# Patient Record
Sex: FEMALE | Race: OTHER | NOT HISPANIC OR LATINO | Employment: FULL TIME | URBAN - METROPOLITAN AREA
[De-identification: names, ages, dates, MRNs, and addresses within clinical notes are randomized per-mention and may not be internally consistent; named-entity substitution may affect disease eponyms.]

---

## 2022-09-02 DIAGNOSIS — G62.9 NEUROPATHY: Primary | ICD-10-CM

## 2022-09-02 DIAGNOSIS — M62.81 MUSCLE WEAKNESS: ICD-10-CM

## 2022-09-28 ENCOUNTER — OFFICE VISIT (OUTPATIENT)
Dept: NEUROLOGY | Facility: CLINIC | Age: 42
End: 2022-09-28
Payer: COMMERCIAL

## 2022-09-28 VITALS
HEART RATE: 84 BPM | DIASTOLIC BLOOD PRESSURE: 86 MMHG | WEIGHT: 167 LBS | SYSTOLIC BLOOD PRESSURE: 110 MMHG | HEIGHT: 61 IN | BODY MASS INDEX: 31.53 KG/M2

## 2022-09-28 DIAGNOSIS — G62.9 NEUROPATHY: ICD-10-CM

## 2022-09-28 DIAGNOSIS — M62.81 MUSCLE WEAKNESS: ICD-10-CM

## 2022-09-28 PROCEDURE — 3008F BODY MASS INDEX DOCD: CPT | Mod: CPTII,S$GLB,, | Performed by: PSYCHIATRY & NEUROLOGY

## 2022-09-28 PROCEDURE — 99204 OFFICE O/P NEW MOD 45 MIN: CPT | Mod: S$GLB,,, | Performed by: PSYCHIATRY & NEUROLOGY

## 2022-09-28 PROCEDURE — 99204 PR OFFICE/OUTPT VISIT, NEW, LEVL IV, 45-59 MIN: ICD-10-PCS | Mod: S$GLB,,, | Performed by: PSYCHIATRY & NEUROLOGY

## 2022-09-28 PROCEDURE — 3079F DIAST BP 80-89 MM HG: CPT | Mod: CPTII,S$GLB,, | Performed by: PSYCHIATRY & NEUROLOGY

## 2022-09-28 PROCEDURE — 99999 PR PBB SHADOW E&M-EST. PATIENT-LVL IV: ICD-10-PCS | Mod: PBBFAC,,, | Performed by: PSYCHIATRY & NEUROLOGY

## 2022-09-28 PROCEDURE — 1160F RVW MEDS BY RX/DR IN RCRD: CPT | Mod: CPTII,S$GLB,, | Performed by: PSYCHIATRY & NEUROLOGY

## 2022-09-28 PROCEDURE — 1160F PR REVIEW ALL MEDS BY PRESCRIBER/CLIN PHARMACIST DOCUMENTED: ICD-10-PCS | Mod: CPTII,S$GLB,, | Performed by: PSYCHIATRY & NEUROLOGY

## 2022-09-28 PROCEDURE — 3074F SYST BP LT 130 MM HG: CPT | Mod: CPTII,S$GLB,, | Performed by: PSYCHIATRY & NEUROLOGY

## 2022-09-28 PROCEDURE — 99999 PR PBB SHADOW E&M-EST. PATIENT-LVL IV: CPT | Mod: PBBFAC,,, | Performed by: PSYCHIATRY & NEUROLOGY

## 2022-09-28 PROCEDURE — 1159F MED LIST DOCD IN RCRD: CPT | Mod: CPTII,S$GLB,, | Performed by: PSYCHIATRY & NEUROLOGY

## 2022-09-28 PROCEDURE — 3008F PR BODY MASS INDEX (BMI) DOCUMENTED: ICD-10-PCS | Mod: CPTII,S$GLB,, | Performed by: PSYCHIATRY & NEUROLOGY

## 2022-09-28 PROCEDURE — 1159F PR MEDICATION LIST DOCUMENTED IN MEDICAL RECORD: ICD-10-PCS | Mod: CPTII,S$GLB,, | Performed by: PSYCHIATRY & NEUROLOGY

## 2022-09-28 PROCEDURE — 3079F PR MOST RECENT DIASTOLIC BLOOD PRESSURE 80-89 MM HG: ICD-10-PCS | Mod: CPTII,S$GLB,, | Performed by: PSYCHIATRY & NEUROLOGY

## 2022-09-28 PROCEDURE — 3074F PR MOST RECENT SYSTOLIC BLOOD PRESSURE < 130 MM HG: ICD-10-PCS | Mod: CPTII,S$GLB,, | Performed by: PSYCHIATRY & NEUROLOGY

## 2022-09-28 RX ORDER — PNV NO.95/FERROUS FUM/FOLIC AC 28MG-0.8MG
5000 TABLET ORAL DAILY
COMMUNITY

## 2022-09-28 RX ORDER — CHOLECALCIFEROL (VITAMIN D3) 125 MCG
5000 CAPSULE ORAL DAILY
COMMUNITY

## 2022-09-28 RX ORDER — GABAPENTIN 300 MG/1
300 CAPSULE ORAL DAILY
Qty: 30 CAPSULE | Refills: 11 | Status: SHIPPED | OUTPATIENT
Start: 2022-09-28 | End: 2023-03-15

## 2022-09-28 RX ORDER — GABAPENTIN 100 MG/1
2 CAPSULE ORAL DAILY
COMMUNITY
Start: 2022-09-01 | End: 2022-09-28 | Stop reason: SDUPTHER

## 2022-09-28 RX ORDER — FLUCONAZOLE 150 MG/1
300 TABLET ORAL
COMMUNITY
Start: 2022-09-20 | End: 2023-03-15

## 2022-09-28 RX ORDER — LIOTHYRONINE SODIUM 5 UG/1
5 TABLET ORAL 2 TIMES DAILY
COMMUNITY
Start: 2022-07-25

## 2022-09-28 RX ORDER — HYDROCHLOROTHIAZIDE 25 MG/1
25 TABLET ORAL DAILY
COMMUNITY
Start: 2022-08-15

## 2022-09-28 RX ORDER — ESTRADIOL 2 MG/1
2 TABLET ORAL DAILY
COMMUNITY
Start: 2022-08-15

## 2022-09-28 RX ORDER — LEVOTHYROXINE SODIUM 50 UG/1
50 TABLET ORAL EVERY MORNING
COMMUNITY
Start: 2022-07-25

## 2022-09-28 RX ORDER — OLOPATADINE HYDROCHLORIDE 1 MG/ML
1 SOLUTION/ DROPS OPHTHALMIC DAILY
COMMUNITY

## 2022-09-28 RX ORDER — CYCLOSPORINE 0.5 MG/ML
1 EMULSION OPHTHALMIC DAILY
COMMUNITY

## 2022-09-28 NOTE — PROGRESS NOTES
Chief Complaint   Patient presents with    Muscle weakness, Neuropathy     NP: Referred by Dolores Andrew NP for neuro consult to evaluate for Muscle weakness and Neuropathy: Is having muscle weakness to hands and feet for past 2 years. Has burning pain to feel. Feet feels cold and tingles. Hands are weak and tingles, falls asleep at night.        This is a 41 y.o. female  here for  neuro consult to evaluate for Muscle weakness and Neuropathy: Is having muscle weakness to hands and feet for past 2 years. Has burning pain to feet. Feet feels cold and tingle. Hands are weak and tingle especially while falling asleep at night.  Symptoms actually started with toenails becoming very thick and pain in her heels.  Around that same time she had dryness of her eyes.  She was diagnosed with thyroid disorder and started on medicine.  She actually saw a rheumatologist and was sent there by her optometrist.  From her report workup with rheumatologist was inconclusive.  Her father had neuropathy and was diabetic.  Patient herself is not diabetic.      MRI brain and C-spine-normal  Vitamin-D 67 folate 23   Sed rate 20   CRP to  NEISHA negative  B12 2308  Creatinine 0.62  Medication List with Changes/Refills   Current Medications    CHOLECALCIFEROL, VITAMIN D3, 125 MCG (5,000 UNIT) CAPSULE    Take 5,000 Units by mouth once daily.    CYANOCOBALAMIN (VITAMIN B-12) 100 MCG TABLET    Take 5,000 mcg by mouth once daily.    CYCLOSPORINE (RESTASIS) 0.05 % OPHTHALMIC EMULSION    Place 1 drop into both eyes Daily.    ESTRADIOL (ESTRACE) 2 MG TABLET    Take 2 mg by mouth once daily.    FLUCONAZOLE (DIFLUCAN) 150 MG TAB    Take 300 mg by mouth every 7 days.    HYDROCHLOROTHIAZIDE (HYDRODIURIL) 25 MG TABLET    Take 25 mg by mouth once daily.    LEVOTHYROXINE (SYNTHROID) 50 MCG TABLET    Take 50 mcg by mouth every morning.    LIOTHYRONINE (CYTOMEL) 5 MCG TAB    Take 5 mcg by mouth 2 (two) times daily.    MV-MN/FOLIC/LUTEIN/HERBAL 293 (ALIVE MEN'S 50  PLUS MULTIVIT ORAL)    Take 1 tablet by mouth Daily.    OLOPATADINE (PATANOL) 0.1 % OPHTHALMIC SOLUTION    Place 1 drop into both eyes Daily.   Changed and/or Refilled Medications    Modified Medication Previous Medication    GABAPENTIN (NEURONTIN) 300 MG CAPSULE gabapentin (NEURONTIN) 100 MG capsule       Take 1 capsule (300 mg total) by mouth Daily.    Take 2 capsules by mouth Daily.        Past Surgical History:   Procedure Laterality Date    HYSTERECTOMY      Laparoscopy & tubal  03/2014    LITHOTRIPSY      Right knee surgery Right 1991    WISDOM TOOTH EXTRACTION          Past Medical History:   Diagnosis Date    Dry eyes     Essential (primary) hypertension     Hypothyroidism, unspecified     Neuropathy         Family History   Problem Relation Age of Onset    Hypertension Mother     Hypothyroidism Mother     Diabetes Father     Kidney disease Sister     Heart disease Brother         Social History     Socioeconomic History    Marital status: Unknown   Tobacco Use    Smoking status: Never    Smokeless tobacco: Never   Substance and Sexual Activity    Alcohol use: Never    Drug use: Never          Review of Systems  Review of Systems   Constitutional: Negative for appetite change.   HENT: Negative for sinus pressure and sore throat.    Eyes: Negative for visual disturbance.   Respiratory: Negative for cough and shortness of breath.    Cardiovascular: Negative for chest pain.   Gastrointestinal: Negative for diarrhea and nausea.   Endocrine: Negative for cold intolerance and heat intolerance.   Genitourinary: Negative for dysuria.   Musculoskeletal: Negative for arthralgias and myalgias.   Skin: Negative for rash.   Allergic/Immunologic: Negative for immunocompromised state.   Neurological:        See HPI   Hematological: Does not bruise/bleed easily.   Psychiatric/Behavioral: Negative for hallucinations.      General: alert and oriented, no acute distress, no audible wheezes, pulse intact, no edema, rash along  left foot, looks like desquamation    Vitals:    09/28/22 1355   BP: 110/86   Pulse: 84        Cognition and Comprehension  Speech and language intact  Follows commands  Speech fluent  Attention intact  Memory for recent events intact from history taking  Affect pleasant  Fund of knowledge adequate    Cranial nerves  II. Optic: Visual fields full to confrontation both eyes  III, IV, VI. Oculomotor: Intact, Pupils equal, round and reactive to light, no nystagmus  V. Trigeminal: sensation to light touch normal  VII. No facial asymmetry or facial weakness  VIII. Hearing intact to spoken voice  IX/X. Glossopharyngeal/Vagus: Voice normal, palate rises symmetrically  XI. Axillary: Shoulder shrug normal  XII. Hypoglossal: Intact    Muscle Strength and Tone  Normal upper extremity tone  Normal lower extremity tone  Normal upper extremity strength  Normal lower extremity strength    Sensation  Decreased sensation to pinprick and vibration in a gradient distribution  in BLE    Reflexes  Normal and symmetric    Coordination and Gait  Finger to nose normal  Gait normal       Anum was seen today for muscle weakness, neuropathy.    Diagnoses and all orders for this visit:    Neuropathy  -     Ambulatory referral/consult to Neurology  -     Pyridoxal 5-Phosphate (PLP); Future  -     Hemoglobin A1C; Future  -     Ferritin; Future  -     CK; Future  -     ANTI-SSA + ANTI-SSB; Future  -     gabapentin (NEURONTIN) 300 MG capsule; Take 1 capsule (300 mg total) by mouth Daily.  -     EMG W/ NERVE CONDUCTION 2 Extremities; Future    Muscle weakness  -     Ambulatory referral/consult to Neurology       Trial of ALA

## 2022-10-11 ENCOUNTER — TELEPHONE (OUTPATIENT)
Dept: NEUROLOGY | Facility: CLINIC | Age: 42
End: 2022-10-11
Payer: COMMERCIAL

## 2022-10-11 NOTE — TELEPHONE ENCOUNTER
----- Message from Nabila Colbert MD sent at 10/11/2022  8:37 AM CDT -----  All labs have resulted and are normal.

## 2022-11-14 ENCOUNTER — PROCEDURE VISIT (OUTPATIENT)
Dept: NEUROLOGY | Facility: CLINIC | Age: 42
End: 2022-11-14
Payer: COMMERCIAL

## 2022-11-14 DIAGNOSIS — G62.9 NEUROPATHY: ICD-10-CM

## 2022-11-14 PROCEDURE — 95885 PR MUSC TST DONE W/NERV TST LIM: ICD-10-PCS | Mod: S$GLB,,, | Performed by: PSYCHIATRY & NEUROLOGY

## 2022-11-14 PROCEDURE — 95885 MUSC TST DONE W/NERV TST LIM: CPT | Mod: S$GLB,,, | Performed by: PSYCHIATRY & NEUROLOGY

## 2022-11-14 PROCEDURE — 95909 NRV CNDJ TST 5-6 STUDIES: CPT | Mod: S$GLB,,, | Performed by: PSYCHIATRY & NEUROLOGY

## 2022-11-14 PROCEDURE — 95909 PR NERVE CONDUCTION STUDY; 5-6 STUDIES: ICD-10-PCS | Mod: S$GLB,,, | Performed by: PSYCHIATRY & NEUROLOGY

## 2022-11-14 RX ORDER — DULOXETIN HYDROCHLORIDE 30 MG/1
30 CAPSULE, DELAYED RELEASE ORAL DAILY
Qty: 30 CAPSULE | Refills: 11 | Status: SHIPPED | OUTPATIENT
Start: 2022-11-14 | End: 2023-03-15 | Stop reason: SDUPTHER

## 2022-11-14 NOTE — PROCEDURES
EMG W/ NERVE CONDUCTION 2 Extremities    Date/Time: 11/14/2022 10:00 AM  Performed by: Nabila Colbert MD  Authorized by: Nabila Colbert MD       EMG/NCS Report      REFERRING PHYSICIAN: Dr. Colbert    REASON FOR EVALUATION: peripheral neuropathy    HISTORY OF PRESENT ILLNESS: 42 y.o. year old female here for numbness and tingling in the bilateral upper extremities and lower extremities    EXAMINATION: Limited neurologic examination shows 5/5 strength    NERVE CONDUCTION STUDIES:  Bilateral sural antidromic sensory responses showed normal peak latencies and normal amplitude on the right, borderline on the left.  Bilateral peroneal and tibial motor nerve conduction studies showed normal distal motor latencies, normal CMAP amplitudes, and normal peroneal motor nerve conduction velocities, borderline tibial motor nerve conduction velocities.   F wave minimal latencies were within normal limits.     NEEDLE EMG: Concentric needle EMG was performed on the left tibialis anterior, gastrocnemius, vastus medialis.  All muscles tested were normal.     IMPRESSION: This is a normal electrophysiological study.  There was  electrodiagnostic evidence of an early sensorimotor neuropathy.       Cymbalta started for neuropathic pain

## 2023-03-15 ENCOUNTER — OFFICE VISIT (OUTPATIENT)
Dept: NEUROLOGY | Facility: CLINIC | Age: 43
End: 2023-03-15
Payer: COMMERCIAL

## 2023-03-15 VITALS
WEIGHT: 163 LBS | HEART RATE: 80 BPM | SYSTOLIC BLOOD PRESSURE: 122 MMHG | DIASTOLIC BLOOD PRESSURE: 86 MMHG | BODY MASS INDEX: 30.78 KG/M2 | HEIGHT: 61 IN

## 2023-03-15 DIAGNOSIS — G62.9 PERIPHERAL POLYNEUROPATHY: Primary | ICD-10-CM

## 2023-03-15 DIAGNOSIS — G62.9 NEUROPATHY: ICD-10-CM

## 2023-03-15 PROCEDURE — 3008F BODY MASS INDEX DOCD: CPT | Mod: CPTII,S$GLB,, | Performed by: NURSE PRACTITIONER

## 2023-03-15 PROCEDURE — 1159F PR MEDICATION LIST DOCUMENTED IN MEDICAL RECORD: ICD-10-PCS | Mod: CPTII,S$GLB,, | Performed by: NURSE PRACTITIONER

## 2023-03-15 PROCEDURE — 99999 PR PBB SHADOW E&M-EST. PATIENT-LVL III: ICD-10-PCS | Mod: PBBFAC,,, | Performed by: NURSE PRACTITIONER

## 2023-03-15 PROCEDURE — 1160F PR REVIEW ALL MEDS BY PRESCRIBER/CLIN PHARMACIST DOCUMENTED: ICD-10-PCS | Mod: CPTII,S$GLB,, | Performed by: NURSE PRACTITIONER

## 2023-03-15 PROCEDURE — 1160F RVW MEDS BY RX/DR IN RCRD: CPT | Mod: CPTII,S$GLB,, | Performed by: NURSE PRACTITIONER

## 2023-03-15 PROCEDURE — 99214 OFFICE O/P EST MOD 30 MIN: CPT | Mod: S$GLB,,, | Performed by: NURSE PRACTITIONER

## 2023-03-15 PROCEDURE — 3074F PR MOST RECENT SYSTOLIC BLOOD PRESSURE < 130 MM HG: ICD-10-PCS | Mod: CPTII,S$GLB,, | Performed by: NURSE PRACTITIONER

## 2023-03-15 PROCEDURE — 3008F PR BODY MASS INDEX (BMI) DOCUMENTED: ICD-10-PCS | Mod: CPTII,S$GLB,, | Performed by: NURSE PRACTITIONER

## 2023-03-15 PROCEDURE — 3079F PR MOST RECENT DIASTOLIC BLOOD PRESSURE 80-89 MM HG: ICD-10-PCS | Mod: CPTII,S$GLB,, | Performed by: NURSE PRACTITIONER

## 2023-03-15 PROCEDURE — 3079F DIAST BP 80-89 MM HG: CPT | Mod: CPTII,S$GLB,, | Performed by: NURSE PRACTITIONER

## 2023-03-15 PROCEDURE — 99999 PR PBB SHADOW E&M-EST. PATIENT-LVL III: CPT | Mod: PBBFAC,,, | Performed by: NURSE PRACTITIONER

## 2023-03-15 PROCEDURE — 99214 PR OFFICE/OUTPT VISIT, EST, LEVL IV, 30-39 MIN: ICD-10-PCS | Mod: S$GLB,,, | Performed by: NURSE PRACTITIONER

## 2023-03-15 PROCEDURE — 1159F MED LIST DOCD IN RCRD: CPT | Mod: CPTII,S$GLB,, | Performed by: NURSE PRACTITIONER

## 2023-03-15 PROCEDURE — 3074F SYST BP LT 130 MM HG: CPT | Mod: CPTII,S$GLB,, | Performed by: NURSE PRACTITIONER

## 2023-03-15 RX ORDER — DULOXETIN HYDROCHLORIDE 30 MG/1
30 CAPSULE, DELAYED RELEASE ORAL 2 TIMES DAILY
Qty: 60 CAPSULE | Refills: 5 | Status: SHIPPED | OUTPATIENT
Start: 2023-03-15 | End: 2024-03-14

## 2023-03-15 RX ORDER — PROGESTERONE 100 MG/1
100 CAPSULE ORAL NIGHTLY
COMMUNITY
Start: 2023-03-03

## 2023-03-15 RX ORDER — METFORMIN HYDROCHLORIDE 500 MG/1
1000 TABLET, EXTENDED RELEASE ORAL DAILY
COMMUNITY
Start: 2023-02-15

## 2023-03-15 NOTE — PROGRESS NOTES
Subjective:       Patient ID: Anum Sweet is a 42 y.o. female.    Chief Complaint: Peripheral Neuropathy (Pt states Cymbalta has not been helpful denies decrease of tingling/burning sensation )      History of Present Illness:  Follow-up visit for peripheral neuropathy.  Patient was started on Cymbalta in November.  Since starting the Cymbalta, she thinks she might have noticed a little bit of improvement in her symptoms.  She says she does not experience a definitive pain.  She has difficulty describing the sensation in her feet that is bothersome.  The symptoms are more pronounced on the right.  She says that she does feel like she can maybe feel things a little bit better when she is walking since being on the Cymbalta.  She is not had any side effects since starting the medication and is taking 30 mg daily.  She continues to feel like her legs are weak, but knows that there is no objective weakness on her physical exam.  She denies any falls.  Labs results and EMG results reviewed today      Review of Systems  I have reviewed a 12 point review of systems which is negative unless otherwise stated in HPI        Past Medical History:   Diagnosis Date    Dry eyes     Essential (primary) hypertension     Hypothyroidism, unspecified     Neuropathy        Past Surgical History:   Procedure Laterality Date    HYSTERECTOMY      Laparoscopy & tubal  03/2014    LITHOTRIPSY      Right knee surgery Right 1991    WISDOM TOOTH EXTRACTION          Family History   Problem Relation Age of Onset    Hypertension Mother     Hypothyroidism Mother     Diabetes Father     Kidney disease Sister     Heart disease Brother         Social History     Socioeconomic History    Marital status: Unknown   Tobacco Use    Smoking status: Never    Smokeless tobacco: Never   Substance and Sexual Activity    Alcohol use: Never    Drug use: Never        Outpatient Encounter Medications as of 3/15/2023   Medication Sig Dispense Refill     "cholecalciferol, vitamin D3, 125 mcg (5,000 unit) capsule Take 5,000 Units by mouth once daily.      cyanocobalamin (VITAMIN B-12) 100 MCG tablet Take 5,000 mcg by mouth once daily.      cycloSPORINE (RESTASIS) 0.05 % ophthalmic emulsion Place 1 drop into both eyes Daily.      estradioL (ESTRACE) 2 MG tablet Take 2 mg by mouth once daily.      hydroCHLOROthiazide (HYDRODIURIL) 25 MG tablet Take 25 mg by mouth once daily.      levothyroxine (SYNTHROID) 50 MCG tablet Take 50 mcg by mouth every morning.      liothyronine (CYTOMEL) 5 MCG Tab Take 5 mcg by mouth 2 (two) times daily.      metFORMIN (GLUCOPHAGE-XR) 500 MG ER 24hr tablet Take 1,000 mg by mouth Daily.      mv-mn/folic/lutein/herbal 293 (ALIVE MEN'S 50 PLUS MULTIVIT ORAL) Take 1 tablet by mouth Daily.      olopatadine (PATANOL) 0.1 % ophthalmic solution Place 1 drop into both eyes Daily.      progesterone (PROMETRIUM) 100 MG capsule Take 100 mg by mouth every evening.      [DISCONTINUED] DULoxetine (CYMBALTA) 30 MG capsule Take 1 capsule (30 mg total) by mouth once daily. 30 capsule 11    DULoxetine (CYMBALTA) 30 MG capsule Take 1 capsule (30 mg total) by mouth 2 (two) times daily. 60 capsule 5    [DISCONTINUED] fluconazole (DIFLUCAN) 150 MG Tab Take 300 mg by mouth every 7 days.      [DISCONTINUED] gabapentin (NEURONTIN) 300 MG capsule Take 1 capsule (300 mg total) by mouth Daily. 30 capsule 11     No facility-administered encounter medications on file as of 3/15/2023.      Objective:   /86   Pulse 80   Ht 5' 1" (1.549 m)   Wt 73.9 kg (163 lb)   BMI 30.80 kg/m²        Physical Exam  NAD  alert and oriented  cognition and perception intact  no aphasia  EOMI  no facial asymmetry  no dysarthria  moves all extremities symmetrically  no gross coordination abnormalities  gait normal       Assessment & Plan:      1. Peripheral polyneuropathy  Overview:  Burning and tingling to bilateral feet and hands.  MRI brain and C-spine were normal.  Sed rate 20, NEISHA " negative, B12 2300, SSA/SSB negative, CK 49, ferritin 125, A1c 5.3.  EMG 11/2022 with electrodiagnostic evidence of an early sensory motor neuropathy.  Tried gbp in the past, but it was ineffective    Assessment & Plan:  -Increase cymbalta to 30 mg BID      2. Neuropathy  -     DULoxetine (CYMBALTA) 30 MG capsule; Take 1 capsule (30 mg total) by mouth 2 (two) times daily.  Dispense: 60 capsule; Refill: 5          This note was created with the assistance of voice recognition software. There may be transcription errors as a result of using this technology however minimal. Effort has been made to assure accuracy of transcription but any obvious errors or omissions should be clarified with the author of the document.

## 2023-09-05 ENCOUNTER — OFFICE VISIT (OUTPATIENT)
Dept: NEUROLOGY | Facility: CLINIC | Age: 43
End: 2023-09-05
Payer: COMMERCIAL

## 2023-09-05 VITALS
HEIGHT: 61 IN | DIASTOLIC BLOOD PRESSURE: 90 MMHG | WEIGHT: 160 LBS | HEART RATE: 76 BPM | SYSTOLIC BLOOD PRESSURE: 131 MMHG | BODY MASS INDEX: 30.21 KG/M2

## 2023-09-05 DIAGNOSIS — G62.9 PERIPHERAL POLYNEUROPATHY: Primary | ICD-10-CM

## 2023-09-05 PROCEDURE — 99214 OFFICE O/P EST MOD 30 MIN: CPT | Mod: S$GLB,,, | Performed by: NURSE PRACTITIONER

## 2023-09-05 PROCEDURE — 1160F PR REVIEW ALL MEDS BY PRESCRIBER/CLIN PHARMACIST DOCUMENTED: ICD-10-PCS | Mod: CPTII,S$GLB,, | Performed by: NURSE PRACTITIONER

## 2023-09-05 PROCEDURE — 3008F BODY MASS INDEX DOCD: CPT | Mod: CPTII,S$GLB,, | Performed by: NURSE PRACTITIONER

## 2023-09-05 PROCEDURE — 99999 PR PBB SHADOW E&M-EST. PATIENT-LVL III: ICD-10-PCS | Mod: PBBFAC,,, | Performed by: NURSE PRACTITIONER

## 2023-09-05 PROCEDURE — 3080F DIAST BP >= 90 MM HG: CPT | Mod: CPTII,S$GLB,, | Performed by: NURSE PRACTITIONER

## 2023-09-05 PROCEDURE — 3080F PR MOST RECENT DIASTOLIC BLOOD PRESSURE >= 90 MM HG: ICD-10-PCS | Mod: CPTII,S$GLB,, | Performed by: NURSE PRACTITIONER

## 2023-09-05 PROCEDURE — 1159F MED LIST DOCD IN RCRD: CPT | Mod: CPTII,S$GLB,, | Performed by: NURSE PRACTITIONER

## 2023-09-05 PROCEDURE — 1160F RVW MEDS BY RX/DR IN RCRD: CPT | Mod: CPTII,S$GLB,, | Performed by: NURSE PRACTITIONER

## 2023-09-05 PROCEDURE — 1159F PR MEDICATION LIST DOCUMENTED IN MEDICAL RECORD: ICD-10-PCS | Mod: CPTII,S$GLB,, | Performed by: NURSE PRACTITIONER

## 2023-09-05 PROCEDURE — 99214 PR OFFICE/OUTPT VISIT, EST, LEVL IV, 30-39 MIN: ICD-10-PCS | Mod: S$GLB,,, | Performed by: NURSE PRACTITIONER

## 2023-09-05 PROCEDURE — 3075F SYST BP GE 130 - 139MM HG: CPT | Mod: CPTII,S$GLB,, | Performed by: NURSE PRACTITIONER

## 2023-09-05 PROCEDURE — 3075F PR MOST RECENT SYSTOLIC BLOOD PRESS GE 130-139MM HG: ICD-10-PCS | Mod: CPTII,S$GLB,, | Performed by: NURSE PRACTITIONER

## 2023-09-05 PROCEDURE — 3008F PR BODY MASS INDEX (BMI) DOCUMENTED: ICD-10-PCS | Mod: CPTII,S$GLB,, | Performed by: NURSE PRACTITIONER

## 2023-09-05 PROCEDURE — 99999 PR PBB SHADOW E&M-EST. PATIENT-LVL III: CPT | Mod: PBBFAC,,, | Performed by: NURSE PRACTITIONER

## 2023-09-05 NOTE — ASSESSMENT & PLAN NOTE
-Increase cymbalta as planned.  30 mg BID.  If morning dose causes daytime drowsiness, alter to 60 mg q PM

## 2023-09-05 NOTE — PROGRESS NOTES
Subjective:       Patient ID: Anum Sweet is a 42 y.o. female.    Chief Complaint: Peripheral Neuropathy (Pt states increase of tingling and burning sensation usually consistently through the day )      History of Present Illness:  Follow-up visit for peripheral neuropathy.  At last visit, Cymbalta dose was increased, but she says she never proceeded with increased dose.  She says she was concerned about the possibility that it would make her drowsy during the day.  She is not having any side effects with her 30 mg at bedtime.  She feels her neuropathy is still worsening slightly.  The bottom and top of both of her feet are numb, but are also painful at times.  They tend to be more pronounced on the right.  She denies any falls or weakness.        Review of Systems  I have reviewed a 12 point review of systems which is negative unless otherwise stated in HPI        Past Medical History:   Diagnosis Date    Dry eyes     Essential (primary) hypertension     Hypothyroidism, unspecified     Neuropathy        Past Surgical History:   Procedure Laterality Date    HYSTERECTOMY      Laparoscopy & tubal  03/2014    LITHOTRIPSY      Right knee surgery Right 1991    WISDOM TOOTH EXTRACTION          Family History   Problem Relation Age of Onset    Hypertension Mother     Hypothyroidism Mother     Diabetes Father     Kidney disease Sister     Heart disease Brother         Social History     Socioeconomic History    Marital status: Unknown   Tobacco Use    Smoking status: Never    Smokeless tobacco: Never   Substance and Sexual Activity    Alcohol use: Never    Drug use: Never        Outpatient Encounter Medications as of 9/5/2023   Medication Sig Dispense Refill    cholecalciferol, vitamin D3, 125 mcg (5,000 unit) capsule Take 5,000 Units by mouth once daily.      cyanocobalamin (VITAMIN B-12) 100 MCG tablet Take 5,000 mcg by mouth once daily.      cycloSPORINE (RESTASIS) 0.05 % ophthalmic emulsion Place 1 drop into both  "eyes Daily.      DULoxetine (CYMBALTA) 30 MG capsule Take 1 capsule (30 mg total) by mouth 2 (two) times daily. (Patient taking differently: Take 30 mg by mouth once daily.) 60 capsule 5    estradioL (ESTRACE) 2 MG tablet Take 2 mg by mouth once daily.      hydroCHLOROthiazide (HYDRODIURIL) 25 MG tablet Take 25 mg by mouth once daily.      levothyroxine (SYNTHROID) 50 MCG tablet Take 50 mcg by mouth every morning.      liothyronine (CYTOMEL) 5 MCG Tab Take 5 mcg by mouth 2 (two) times daily.      metFORMIN (GLUCOPHAGE-XR) 500 MG ER 24hr tablet Take 1,000 mg by mouth Daily.      mv-mn/folic/lutein/herbal 293 (ALIVE MEN'S 50 PLUS MULTIVIT ORAL) Take 1 tablet by mouth Daily.      olopatadine (PATANOL) 0.1 % ophthalmic solution Place 1 drop into both eyes Daily.      progesterone (PROMETRIUM) 100 MG capsule Take 100 mg by mouth every evening.       No facility-administered encounter medications on file as of 9/5/2023.      Objective:   BP (!) 131/90   Pulse 76   Ht 5' 1" (1.549 m)   Wt 72.6 kg (160 lb)   BMI 30.23 kg/m²        Physical Exam  General:  Alert and oriented  NAD  No overt edema    Cognition and Comprehension:  Speech and language intact  Follows commands    Cranial nerves:   CN 2_ Visual fields (full to confrontation both eyes)  CN 3, 4, 6_ Intact, GIRISH, no nystagmus  CN 7_no face asymmetry; normal eye closure and smile  CN 8_hearing intact to spoken voice  CN 9, 10, 11_voice normal, shoulder shrug ok; deltoids not fatigable     Muscle Strength and Tone:  Normal upper extremity tone  Normal lower extremity tone  Normal upper extremity strength  Normal lower extremity strength    Sensation:  Decr sensation to PP in gradient distribution distal BLE    Coordination and Gait:  Coordination and gait are normal   No ataxia with FTN      Assessment & Plan:      1. Peripheral polyneuropathy  Overview:  Burning and tingling to bilateral feet and hands.  MRI brain and C-spine were normal.  Sed rate 20, NEISHA " negative, B12 2300, SSA/SSB negative, CK 49, ferritin 125, A1c 5.3.  EMG 11/2022 with electrodiagnostic evidence of an early sensory motor neuropathy.  Tried gbp in the past, but it was ineffective and caused drowsiness    Assessment & Plan:  -Increase cymbalta as planned.  30 mg BID.  If morning dose causes daytime drowsiness, alter to 60 mg q PM            This note was created with the assistance of voice recognition software. There may be transcription errors as a result of using this technology however minimal. Effort has been made to assure accuracy of transcription but any obvious errors or omissions should be clarified with the author of the document.

## 2024-01-18 ENCOUNTER — OFFICE VISIT (OUTPATIENT)
Dept: NEUROLOGY | Facility: CLINIC | Age: 44
End: 2024-01-18
Payer: COMMERCIAL

## 2024-01-18 VITALS
SYSTOLIC BLOOD PRESSURE: 132 MMHG | HEIGHT: 61 IN | WEIGHT: 162.63 LBS | HEART RATE: 83 BPM | BODY MASS INDEX: 30.71 KG/M2 | DIASTOLIC BLOOD PRESSURE: 93 MMHG

## 2024-01-18 DIAGNOSIS — G62.9 PERIPHERAL POLYNEUROPATHY: Primary | ICD-10-CM

## 2024-01-18 PROCEDURE — 3008F BODY MASS INDEX DOCD: CPT | Mod: CPTII,S$GLB,, | Performed by: NURSE PRACTITIONER

## 2024-01-18 PROCEDURE — 99213 OFFICE O/P EST LOW 20 MIN: CPT | Mod: S$GLB,,, | Performed by: NURSE PRACTITIONER

## 2024-01-18 PROCEDURE — 3080F DIAST BP >= 90 MM HG: CPT | Mod: CPTII,S$GLB,, | Performed by: NURSE PRACTITIONER

## 2024-01-18 PROCEDURE — 3075F SYST BP GE 130 - 139MM HG: CPT | Mod: CPTII,S$GLB,, | Performed by: NURSE PRACTITIONER

## 2024-01-18 PROCEDURE — 1160F RVW MEDS BY RX/DR IN RCRD: CPT | Mod: CPTII,S$GLB,, | Performed by: NURSE PRACTITIONER

## 2024-01-18 PROCEDURE — 99999 PR PBB SHADOW E&M-EST. PATIENT-LVL III: CPT | Mod: PBBFAC,,, | Performed by: NURSE PRACTITIONER

## 2024-01-18 PROCEDURE — 1159F MED LIST DOCD IN RCRD: CPT | Mod: CPTII,S$GLB,, | Performed by: NURSE PRACTITIONER

## 2024-01-18 NOTE — PROGRESS NOTES
Subjective:       Patient ID: Anum Sweet is a 43 y.o. female.    Chief Complaint: Peripheral Neuropathy (Pt states symptoms doing well since increase of cymbalta 30 mg BID flares are not as severe )      History of Present Illness:  Follow-up visit for peripheral neuropathy.  Her Cymbalta was increased to 30 mg b.i.d. at last visit.  Since increasing the dose, she has definitely noticed a decrease in the intensity of her numbness and tingling sensations to her hands and feet.  She continues to have numbness and tingling, but it is no longer painful on the higher dose of Cymbalta.  She still occasionally has some breakthrough pain, but it is not nearly as intense as it was prior to the increase.  She denies any side effects with the increased dose.  Denies any weakness or falls.        Review of Systems  I have reviewed a 12 point review of systems which is negative unless otherwise stated in HPI        Past Medical History:   Diagnosis Date    Dry eyes     Essential (primary) hypertension     Hypothyroidism, unspecified     Neuropathy        Past Surgical History:   Procedure Laterality Date    HYSTERECTOMY      Laparoscopy & tubal  03/2014    LITHOTRIPSY      Right knee surgery Right 1991    WISDOM TOOTH EXTRACTION          Family History   Problem Relation Age of Onset    Hypertension Mother     Hypothyroidism Mother     Diabetes Father     Kidney disease Sister     Heart disease Brother         Social History     Socioeconomic History    Marital status: Unknown   Tobacco Use    Smoking status: Never    Smokeless tobacco: Never   Substance and Sexual Activity    Alcohol use: Never    Drug use: Never        Outpatient Encounter Medications as of 1/18/2024   Medication Sig Dispense Refill    cholecalciferol, vitamin D3, 125 mcg (5,000 unit) capsule Take 5,000 Units by mouth once daily.      cyanocobalamin (VITAMIN B-12) 100 MCG tablet Take 5,000 mcg by mouth once daily.      cycloSPORINE (RESTASIS) 0.05 %  "ophthalmic emulsion Place 1 drop into both eyes Daily.      DULoxetine (CYMBALTA) 30 MG capsule Take 1 capsule (30 mg total) by mouth 2 (two) times daily. (Patient taking differently: Take 30 mg by mouth once daily.) 60 capsule 5    estradioL (ESTRACE) 2 MG tablet Take 2 mg by mouth once daily.      hydroCHLOROthiazide (HYDRODIURIL) 25 MG tablet Take 25 mg by mouth once daily.      levothyroxine (SYNTHROID) 50 MCG tablet Take 50 mcg by mouth every morning.      liothyronine (CYTOMEL) 5 MCG Tab Take 5 mcg by mouth 2 (two) times daily.      metFORMIN (GLUCOPHAGE-XR) 500 MG ER 24hr tablet Take 1,000 mg by mouth Daily.      olopatadine (PATANOL) 0.1 % ophthalmic solution Place 1 drop into both eyes Daily.      progesterone (PROMETRIUM) 100 MG capsule Take 100 mg by mouth every evening.      [DISCONTINUED] mv-mn/folic/lutein/herbal 293 (ALIVE MEN'S 50 PLUS MULTIVIT ORAL) Take 1 tablet by mouth Daily.       No facility-administered encounter medications on file as of 1/18/2024.      Objective:   BP (!) 132/93   Pulse 83   Ht 5' 1" (1.549 m)   Wt 73.8 kg (162 lb 9.6 oz)   BMI 30.72 kg/m²        Physical Exam  General:  Alert and oriented  NAD  No overt edema    Cognition and Comprehension:  Speech and language intact  Follows commands    Cranial nerves:   CN 2_ Visual fields (full to confrontation both eyes)  CN 3, 4, 6_ Intact, GIRISH, no nystagmus  CN 7_no face asymmetry; normal eye closure and smile  CN 8_hearing intact to spoken voice  CN 9, 10, 11_voice normal, shoulder shrug ok; deltoids not fatigable   CN 12 tongue_protrudes mid line    Muscle Strength and Tone:  Normal upper extremity tone  Normal lower extremity tone  Normal upper extremity strength  Normal lower extremity strength    Reflexes:  Normal and symmetric    Sensation:  Decr sensation to PP BUE gradient dist    Coordination and Gait:  Coordination and gait are normal   NO ataxia with FTN or HKS      Assessment & Plan:      1. Peripheral " polyneuropathy  Overview:  Burning and tingling to bilateral feet and hands.  MRI brain and C-spine were normal.  Sed rate 20, NEISHA negative, B12 2300, SSA/SSB negative, CK 49, ferritin 125, A1c 5.3.  EMG 11/2022 with electrodiagnostic evidence of an early sensory motor neuropathy.  Tried gbp in the past, but it was ineffective and caused drowsiness    Assessment & Plan:  -improved with higher dose of cymbalta, continue 30 mg BID            This note was created with the assistance of voice recognition software. There may be transcription errors as a result of using this technology however minimal. Effort has been made to assure accuracy of transcription but any obvious errors or omissions should be clarified with the author of the document.

## 2024-05-08 DIAGNOSIS — G62.9 NEUROPATHY: ICD-10-CM

## 2024-05-08 RX ORDER — DULOXETIN HYDROCHLORIDE 30 MG/1
30 CAPSULE, DELAYED RELEASE ORAL 2 TIMES DAILY
Qty: 60 CAPSULE | Refills: 5 | Status: SHIPPED | OUTPATIENT
Start: 2024-05-08 | End: 2025-05-08

## 2025-04-08 NOTE — ASSESSMENT & PLAN NOTE
Assessment & Plan     (F33.1) Moderate episode of recurrent major depressive disorder (H)    Comment; worsened   Plan: PHQ 9 score 12 - started on escitalopram (LEXAPRO) 10 MG tablet as directed.explained clearly about the medication,insructions and side effects.   Follow up in 1 month             (F41.1) ELVIN (generalized anxiety disorder)  Comment: worsened   Plan: started on escitalopram (LEXAPRO) 10 MG tablet as directed.explained clearly about the medication,insructions and side effects.   Follow up in 1 month.             The longitudinal plan of care for the diagnosis(es)/condition(s) as documented were addressed during this visit. Due to the added complexity in care, I will continue to support Katie in the subsequent management and with ongoing continuity of care.      Subjective   Katie is a 47 year old, presenting for the following health issues:  Anxiety and Depression      4/8/2025     1:56 PM   Additional Questions   Roomed by edson   Accompanied by self         4/8/2025     1:56 PM   Patient Reported Additional Medications   Patient reports taking the following new medications none          Depression and Anxiety   How are you doing with your depression since your last visit? Worsened ,patient had stopped lexapro since 01/25   How are you doing with your anxiety since your last visit?  Worsened   Are you having other symptoms that might be associated with depression or anxiety? Yes:  anxiety attacks  Have you had a significant life event? No   Do you have any concerns with your use of alcohol or other drugs? No        2/6/2023     3:32 PM 9/25/2024     4:15 PM 4/8/2025     2:09 PM   PHQ   PHQ-9 Total Score 3 8 12   Q9: Thoughts of better off dead/self-harm past 2 weeks Not at all Not at all Not at all         3/15/2022    11:36 AM 9/25/2024     4:17 PM 4/8/2025     2:09 PM   ELVIN-7 SCORE   Total Score   17 (severe anxiety)   Total Score 4 14 11          Past Medical History:   Diagnosis Date     -improved with higher dose of cymbalta, continue 30 mg BID   "Abnormal Pap smear     Herpes simplex without mention of complication     Left breast lump     Lump of right breast     Thyroid disorder     as a child unknown if hyper or hypo         Current Outpatient Medications   Medication Sig Dispense Refill    escitalopram (LEXAPRO) 10 MG tablet Take 1 tablet (10 mg) by mouth daily. 30 tablet 1    diclofenac (VOLTAREN) 75 MG EC tablet Take 1 tablet (75 mg) by mouth 2 times daily as needed for moderate pain. 60 tablet 0    mirabegron (MYRBETRIQ) 25 MG 24 hr tablet Take 1 tablet (25 mg) by mouth daily 30 tablet 3    multivitamin w/minerals (THERA-VIT-M) tablet Take 1 tablet by mouth daily      oxyquinoline-sodium lauryl sulfate (TRIMO-FRIAS) 0.025-0.01 % GEL vaginal gel Place 113.4 g vaginally daily With pessary change as directed 113.4 g 6         Review of Systems  CONSTITUTIONAL: NEGATIVE for fever, chills, change in weight  RESP: NEGATIVE for significant cough or SOB  CV: NEGATIVE for chest pain, palpitations or peripheral edema  PSYCHIATRIC: anxiety and depressed mood      Objective    /74   Pulse 93   Temp 97.6  F (36.4  C) (Tympanic)   Resp 20   Ht 1.499 m (4' 11\")   Wt 64.1 kg (141 lb 6.4 oz)   LMP 04/04/2025 (Approximate)   SpO2 99%   BMI 28.56 kg/m    Body mass index is 28.56 kg/m .  Physical Exam   GENERAL: alert and no distress  EYES: Eyes grossly normal to inspection, PERRL and conjunctivae and sclerae normal  RESP: lungs clear to auscultation - no rales, rhonchi or wheezes  CV: regular rate and rhythm, normal S1 S2,    PSYCH: mentation appears normal, affect normal/bright          Signed Electronically by: Carmen Atkins MD    "